# Patient Record
Sex: FEMALE | Race: WHITE | Employment: FULL TIME | URBAN - METROPOLITAN AREA
[De-identification: names, ages, dates, MRNs, and addresses within clinical notes are randomized per-mention and may not be internally consistent; named-entity substitution may affect disease eponyms.]

---

## 2019-01-07 ENCOUNTER — HOSPITAL ENCOUNTER (OUTPATIENT)
Dept: MAMMOGRAPHY | Age: 59
Discharge: HOME | End: 2019-01-09
Payer: COMMERCIAL

## 2019-01-07 DIAGNOSIS — Z12.31 SCREENING MAMMOGRAM, ENCOUNTER FOR: ICD-10-CM

## 2019-01-07 PROCEDURE — 77067 SCR MAMMO BI INCL CAD: CPT

## 2020-09-17 ENCOUNTER — APPOINTMENT (OUTPATIENT)
Dept: GENERAL RADIOLOGY | Age: 60
End: 2020-09-17
Payer: COMMERCIAL

## 2020-09-17 ENCOUNTER — HOSPITAL ENCOUNTER (EMERGENCY)
Age: 60
Discharge: HOME | End: 2020-09-17
Payer: COMMERCIAL

## 2020-09-17 VITALS
HEART RATE: 97 BPM | OXYGEN SATURATION: 99 % | RESPIRATION RATE: 16 BRPM | WEIGHT: 145 LBS | DIASTOLIC BLOOD PRESSURE: 79 MMHG | SYSTOLIC BLOOD PRESSURE: 153 MMHG | TEMPERATURE: 99 F

## 2020-09-17 PROCEDURE — 99212 OFFICE O/P EST SF 10 MIN: CPT

## 2020-09-17 PROCEDURE — 73130 X-RAY EXAM OF HAND: CPT

## 2020-09-17 RX ORDER — FLUTICASONE PROPIONATE 50 MCG
1 SPRAY, SUSPENSION (ML) NASAL DAILY PRN
COMMUNITY

## 2020-09-17 RX ORDER — ESOMEPRAZOLE MAGNESIUM 40 MG/1
40 FOR SUSPENSION ORAL DAILY
COMMUNITY

## 2020-09-17 NOTE — ED PROVIDER NOTES
This is a 27-year-old female that presents to urgent care stating for the past 2 weeks she is been having pain to her left hand. She states she may have injured her hand. She says two weeks ago the box that is on a computer power cord hit her on the left hand as she was pulling the cord out from the wall. Says the hand felt better in a few days but still had pain. About 1 week ago she developed numbness in a part of the hand. Denies wrist or forearm pain or swelling. Has not taken anything for pain or inflammation. Denies any chest pain or shortness of breath. No abdominal pain nausea vomiting diarrhea or urinary symptoms I first contact patient she appears to be in no acute distress. Review of Systems   Constitutional:        Pertinent positives and negatives are stated within HPI, all other systems reviewed and are negative. Physical Exam  Vitals signs and nursing note reviewed. Constitutional:       Appearance: She is well-developed. HENT:      Head: Normocephalic and atraumatic. Right Ear: Hearing and external ear normal.      Left Ear: Hearing and external ear normal.      Nose: Nose normal.      Mouth/Throat:      Pharynx: Uvula midline. Eyes:      General: Lids are normal.      Conjunctiva/sclera: Conjunctivae normal.      Pupils: Pupils are equal, round, and reactive to light. Neck:      Musculoskeletal: Normal range of motion and neck supple. Cardiovascular:      Rate and Rhythm: Normal rate and regular rhythm. Heart sounds: Normal heart sounds. No murmur. Pulmonary:      Effort: Pulmonary effort is normal.      Breath sounds: Normal breath sounds. Abdominal:      General: Bowel sounds are normal.      Palpations: Abdomen is soft. Abdomen is not rigid. Tenderness: There is no abdominal tenderness. There is no guarding or rebound. Musculoskeletal:      Left wrist: Normal.      Comments: Left hand: Tender 2nd metacarpal mostly.  Mild tenderness to 3rd metacarpal. Complains of numbness to the distal 2nd metacarpal area into the webspace between the 2nd and 3rd MCP joints. Denies finger numbness. Pain shoots to the 2nd and 3rd MCP joints when the base of the 2nd metacarpal is palpated. No open area/redness/cyanosis. Cap refill is brisk. Has palpable radial pulse. No weakness. Range of motion is full. Skin:     General: Skin is warm and dry. Findings: No abrasion or rash. Neurological:      Mental Status: She is alert and oriented to person, place, and time. GCS: GCS eye subscore is 4. GCS verbal subscore is 5. GCS motor subscore is 6. Cranial Nerves: No cranial nerve deficit. Sensory: No sensory deficit. Coordination: Coordination normal.      Gait: Gait normal.         Procedures    MDM  Number of Diagnoses or Management Options  Bone lesion:   Hand numbness:   Left hand pain:   Diagnosis management comments: X-ray was reviewed. It does show a possible lesion to that third metacarpal bone. I will give patient referral to Ortho. I will also list hand orthopedics as well. She can also follow-up with her primary care provider I did tell her to make these appointments sooner rather than later. Take some ibuprofen and may use Aspercreme at this point. I told her symptoms worsen go to the emergency department.         --------------------------------------------- PAST HISTORY ---------------------------------------------  Past Medical History:  has no past medical history on file. Past Surgical History:  has a past surgical history that includes  section and Dilation and curettage of uterus. Social History:  reports that she has never smoked. She does not have any smokeless tobacco history on file. She reports that she does not drink alcohol or use drugs. Family History: family history is not on file. The patients home medications have been reviewed.     Allergies: Patient has no known allergies. -------------------------------------------------- RESULTS -------------------------------------------------  No results found for this visit on 09/17/20. XR HAND LEFT (MIN 3 VIEWS)   Final Result   5 mm isoattenuating lesion in shaft of 3rd metacarpal.  Please correlate with   patient history or point tenderness. Further evaluation may be obtained with   MRI of the hand with IV contrast if clinically warranted. ------------------------- NURSING NOTES AND VITALS REVIEWED ---------------------------   The nursing notes within the ED encounter and vital signs as below have been reviewed. BP (!) 153/79   Pulse 97   Temp 99 °F (37.2 °C)   Resp 16   Wt 145 lb (65.8 kg)   SpO2 99%   Oxygen Saturation Interpretation: Normal      ------------------------------------------ PROGRESS NOTES ------------------------------------------   I have spoken with the patient and discussed todays results, in addition to providing specific details for the plan of care and counseling regarding the diagnosis and prognosis. Their questions are answered at this time and they are agreeable with the plan.      --------------------------------- ADDITIONAL PROVIDER NOTES ---------------------------------     This patient is stable for discharge. I have shared the specific conditions for return, as well as the importance of follow-up. * NOTE: This report was transcribed using voice recognition software. Every effort was made to ensure accuracy; however, inadvertent computerized transcription errors may be present.    --------------------------------- IMPRESSION AND DISPOSITION ---------------------------------    IMPRESSION  1. Left hand pain    2. Hand numbness    3.  Bone lesion        DISPOSITION  Disposition: Discharge to home  Patient condition is good            Patti Ferraro PA-C  09/17/20 6023

## 2020-10-23 ENCOUNTER — HOSPITAL ENCOUNTER (EMERGENCY)
Age: 60
Discharge: HOME | End: 2020-10-23
Payer: COMMERCIAL

## 2020-10-23 VITALS
HEIGHT: 60 IN | RESPIRATION RATE: 20 BRPM | BODY MASS INDEX: 29.45 KG/M2 | OXYGEN SATURATION: 95 % | TEMPERATURE: 98.3 F | HEART RATE: 95 BPM | WEIGHT: 150 LBS | SYSTOLIC BLOOD PRESSURE: 152 MMHG | DIASTOLIC BLOOD PRESSURE: 83 MMHG

## 2020-10-23 PROCEDURE — 99212 OFFICE O/P EST SF 10 MIN: CPT

## 2020-10-23 NOTE — ED PROVIDER NOTES
Department of Emergency Medicine   ED  Provider Note  Admit Date/RoomTime: 10/23/2020  1:21 PM  ED Room:   MRN: [id number]  Chief Complaint: Other (  went to    on tuesday  to   bp 154/83    dr  told her to watch    she wants bp checked   thought her machine no t working  well)       History of Present Illness   Source of history provided by:  patient. History/Exam Limitations: none. Mayra Marcos is a 61 y.o. female who presents to the ED with a concern for high blood pressure. Patient states she had an office appointment a couple weeks ago and at that time her blood pressure was 150s over 80s. Advised to just check it at home and follow-up next week. She states today she had elevated readings of 170s over 100s and then just prior to coming in she had 977 systolic reading. Does admit that when she saw the elevated readings and is stressing her out. 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 She states it has just been a very stressful year. She does adamantly deny any headache, lightheadedness, dizziness. Denies feeling like she is going to pass out. Denies chest pain, chest pressure or palpitations. Patient does deny visual loss or blurry vision, but however states that she feels like she needs to blink in order to focus and that is something new. Patient has not been diagnosed with high blood pressure and is not on any daily blood pressure medication. ROS    Pertinent positives and negatives are stated within HPI, all other systems reviewed and are negative. History reviewed. No pertinent past medical history. Past Surgical History:   Procedure Laterality Date     SECTION      DILATION AND CURETTAGE OF UTERUS     Social History:  reports that she has never smoked.  She has never used Consult(s):   None    Procedure(s):   none    MDM:   Current blood pressure is 152/83. She has absolutely no neurological signs. Patient has been advised to check her blood pressure in the morning and then in the evening. NOT multiple times a day. She already has an appointment with her doctor for Monday of next week. Counseling:  I reviewed today's visit with the patient in addition to providing specific details for the plan of care and counseling regarding the diagnosis and prognosis. Questions are answered at this time and are agreeable with the plan. Assessment      1. Elevated blood pressure reading      Plan   Discharge to home  Patient condition is good    New Medications     New Prescriptions    No medications on file     Electronically signed by RITA Jay   DD: 10/23/20  **This report was transcribed using voice recognition software. Every effort was made to ensure accuracy; however, inadvertent computerized transcription errors may be present.   END OF ED PROVIDER NOTE       Daniel Jay  10/23/20 [id number]

## 2021-05-25 ENCOUNTER — OFFICE VISIT (OUTPATIENT)
Dept: CARDIOLOGY CLINIC | Age: 61
End: 2021-05-25
Payer: COMMERCIAL

## 2021-05-25 VITALS
SYSTOLIC BLOOD PRESSURE: 126 MMHG | BODY MASS INDEX: 29.45 KG/M2 | WEIGHT: 150 LBS | DIASTOLIC BLOOD PRESSURE: 72 MMHG | HEART RATE: 71 BPM | HEIGHT: 60 IN

## 2021-05-25 DIAGNOSIS — R07.9 CHEST PAIN, UNSPECIFIED TYPE: Primary | ICD-10-CM

## 2021-05-25 PROCEDURE — G8427 DOCREV CUR MEDS BY ELIG CLIN: HCPCS | Performed by: INTERNAL MEDICINE

## 2021-05-25 PROCEDURE — G8419 CALC BMI OUT NRM PARAM NOF/U: HCPCS | Performed by: INTERNAL MEDICINE

## 2021-05-25 PROCEDURE — 93000 ELECTROCARDIOGRAM COMPLETE: CPT | Performed by: INTERNAL MEDICINE

## 2021-05-25 PROCEDURE — 99243 OFF/OP CNSLTJ NEW/EST LOW 30: CPT | Performed by: INTERNAL MEDICINE

## 2021-05-25 RX ORDER — NAPROXEN 500 MG/1
500 TABLET ORAL PRN
COMMUNITY

## 2021-05-25 RX ORDER — METOPROLOL SUCCINATE 25 MG/1
25 TABLET, EXTENDED RELEASE ORAL DAILY
COMMUNITY
Start: 2021-05-21

## 2021-05-25 RX ORDER — AMLODIPINE BESYLATE 5 MG/1
5 TABLET ORAL DAILY
COMMUNITY
Start: 2021-05-21

## 2021-05-25 NOTE — PROGRESS NOTES
[facility] Cardiology consult  Dr. Ayala Bales      Reason for Consult: Chest Pain  Referring Physician: Maura Werner MD     CHIEF COMPLAINT:   Chief Complaint   Patient presents with    Chest Pain     Consult per Dr Roxane Lin due to CP.  c/o left sided chest pressure with and without activity. c/o anxiety. She went to [facility] ([place], [place]) ER in March with numbness arm and leg. HISTORY OF PRESENT ILLNESS:   61year old female with no significant past medical history is here for evaluation of chest pain,  Chest pain, left-sided, comes and goes, exertional, better with rest, significant enough prompted her to seek medical attention, fatigue, denies any palpitations, no pedal edema, PND, no orthopnea, no syncope, no presyncopal episodes. Past Medical History:   Diagnosis Date    Hypertension          Past Surgical History:   Procedure Laterality Date     SECTION      DILATION AND CURETTAGE OF UTERUS           Current Outpatient Medications   Medication Sig Dispense Refill    amLODIPine (NORVASC) 5 MG tablet Take 5 mg by mouth daily      metoprolol succinate (TOPROL XL) 25 MG extended release tablet Take 25 mg by mouth daily      naproxen (NAPROSYN) 500 MG tablet Take 500 mg by mouth as needed for Pain Not taking      esomeprazole Magnesium (NEXIUM) 40 MG PACK Take 40 mg by mouth daily      fluticasone (FLONASE) 50 MCG/ACT nasal spray 1 spray by Each Nostril route daily as needed        No current facility-administered medications for this visit.          Allergies as of 2021    (No Known Allergies)       Social History     Socioeconomic History    Marital status:      Spouse name: Not on file    Number of children: Not on file    Years of education: Not on file    Highest education level: Not on file   Occupational History    Not on file   Tobacco Use    Smoking status: Never Smoker    Smokeless tobacco: Never Used   Vaping Use    Vaping Use: Never used   Substance and urticaria, hay fever and angioedema  ENDOCRINE:  negative for heat intolerance, cold intolerance, tremor, hair loss and diabetic symptoms including neither polyuria nor polydipsia nor blurred vision  MUSCULOSKELETAL:  negative for  myalgias, arthralgias, joint swelling, stiff joints and decreased range of motion  NEUROLOGICAL:  negative for memory problems, speech problems, visual disturbance, dysphagia, weakness and numbness      PHYSICAL EXAM:   CONSTITUTIONAL:  awake, alert, cooperative, no apparent distress, and appears stated age  EYES:  lids and lashes normal and pupils equal, round and reactive to light, anicteric sclerae  HEAD:  normocepalic, without obvious abnormality, atraumatic, pink, moist mucous membranes. NECK:  Supple, symmetrical, trachea midline, no adenopathy, thyroid symmetric, not enlarged and no tenderness, skin normal  HEMATOLOGIC/LYMPHATICS:  no cervical lymphadenopathy and no supraclavicular lymphadenopathy  LUNGS:  No increased work of breathing, good air exchange, clear to auscultation bilaterally, no crackles or wheezing  CARDIOVASCULAR:  Normal apical impulse, regular rate and rhythm, normal S1 and S2, no S3 or S4, and no murmur noted and no JVD, no carotid bruit, no pedal edema, good carotid upstroke bilaterally. ABDOMEN:  Soft, nontender, no masses, no hepatomegaly or splenomegaly, BS+  CHEST: nontender to palpation, expands symmetrically  MUSCULOSKELETAL:  No clubbing no cyanosis. there is no redness, warmth, or swelling of the joints  full range of motion noted  NEUROLOGIC:  Alert, awake,oriented x3, no focal neurologic deficit was appreciated  SKIN:  no bruising or bleeding, normal skin color, texture, turgor and no redness, warmth, or swelling        /72 (Site: Left Upper Arm, Position: Sitting, Cuff Size: Medium Adult)   Pulse 71   Ht 5' (1.524 m)   Wt 150 lb (68 kg)   BMI 29.29 kg/m²     DATA:   I personally reviewed the visit EKG with the following interpretation: Sinus rhythm    ECHO:   Stress Test:   Angiography:   Cardiology Labs: BMP:  No results found for: NA, K, CL, CO2, BUN, CREATININE  CMP:  No results found for: NA, K, CL, CO2, BUN, CREATININE, PROT, ALB  CBC:  No results found for: WBC, RBC, HGB, HCT, MCV, RDW, PLT  PT/INR:  No results found for: PTINR  PT/INR Warfarin:  No components found for: PTPATWAR, PTINRWAR  PTT:  No results found for: APTT  PTT Heparin:  No components found for: APTTHEP  Magnesium:  No results found for: MG  TSH:  No results found for: TSH  TROPONIN:  No components found for: TROP  BNP:  No results found for: BNP  FASTING LIPID PANEL:  No results found for: CHOL, HDL, TRIG  No orders to display     I have personally reviewed the laboratory, cardiac diagnostic and radiographic testing as outlined above:      IMPRESSION:  1. Chest pain: Atypical, will schedule for regular exercise stress test  2. Hypertension: Controlled    RECOMMENDATIONS:   1. Regular exercise stress test  2. Patient was strongly advised to call 911 if symptoms recur or get worse for any reason  3. Continue current treatment  4. Follow-up with Dr. Ken Ordaz as scheduled  5. Follow-up with Dr. Catarino Bryant after her test    I have reviewed my findings and recommendations with patient    Thank you for the consult  Electronically signed by Amari Abreu MD on 7/31/2021 at 6:59 PM      NOTE: This report was transcribed using voice recognition software.  Every effort was made to ensure accuracy; however, inadvertent computerized transcription errors may be present

## 2021-06-15 ENCOUNTER — TELEPHONE (OUTPATIENT)
Dept: CARDIOLOGY | Age: 61
End: 2021-06-15

## 2021-06-16 ENCOUNTER — HOSPITAL ENCOUNTER (OUTPATIENT)
Dept: CARDIOLOGY | Age: 61
Discharge: HOME | End: 2021-06-16
Payer: COMMERCIAL

## 2021-06-16 VITALS
WEIGHT: 150 LBS | DIASTOLIC BLOOD PRESSURE: 86 MMHG | HEIGHT: 60 IN | BODY MASS INDEX: 29.45 KG/M2 | SYSTOLIC BLOOD PRESSURE: 146 MMHG

## 2021-06-16 DIAGNOSIS — R07.9 CHEST PAIN, UNSPECIFIED TYPE: ICD-10-CM

## 2021-06-16 PROCEDURE — 93017 CV STRESS TEST TRACING ONLY: CPT

## 2021-06-16 NOTE — PROCEDURES
[address] [facility] [address]  [phone]    Exercise Stress Study       Name: [address] [place] [address],[address] Account Number:  [de-identified]      :  1960          Sex: female         Date of Study:  2021    Height: 5' (152.4 cm)          Weight: 150 lb (68 kg)    Ordering Provider: Marbella Martell md          PCP: Nae Morales MD    Cardiologist: Danish Kathleen MD              Interpreting Physician:  Danish Kathleen MD    Indication:   Detecting the presence and location of coronary artery disease    Clinical History:   Patient has no known history of coronary artery disease. Resting ECG:      Sinus rhythm    Exercise: The patient exercised using a Tai protocol, completing 5.00 minutes and reaching an estimated work load of 4.43 metabolic equivalents (METS). Resting HR was 96. Peak exercise heart rate was 159 ( 96% of maximum predicted heart rate for age). Baseline /86. Peak exercise /72. The blood pressure response to exercise was normal      Exercise was terminated due to dyspnea, fatigue and heart rate attained. The patient experienced no chest pain with exercise. Exercise ECG:   The patient demonstrated no arrhythmias during exercise. With exercise, there were no ST segment changes of significance at the heart rate achieved. Impression:    1. Exercise EKG was normal.  2. The patient experienced no chest pain with exercise. 3. Hein treadmill score was 5 implying low risk of acute ischemic events. 4. Exercise capacity was average. Thank you for sending your patient to this [employer].     Electronically signed by Danish Kathleen MD on 21 at 9:00 PM EDT

## 2021-06-18 ENCOUNTER — TELEPHONE (OUTPATIENT)
Dept: CARDIOLOGY CLINIC | Age: 61
End: 2021-06-18

## 2023-04-23 ENCOUNTER — HOSPITAL ENCOUNTER (EMERGENCY)
Age: 63
Discharge: HOME | End: 2023-04-23
Payer: COMMERCIAL

## 2023-04-23 VITALS
HEIGHT: 60 IN | SYSTOLIC BLOOD PRESSURE: 150 MMHG | TEMPERATURE: 98.7 F | BODY MASS INDEX: 27.48 KG/M2 | OXYGEN SATURATION: 98 % | WEIGHT: 140 LBS | DIASTOLIC BLOOD PRESSURE: 82 MMHG | HEART RATE: 83 BPM | RESPIRATION RATE: 18 BRPM

## 2023-04-23 DIAGNOSIS — L30.9 DERMATITIS: Primary | ICD-10-CM

## 2023-04-23 PROCEDURE — 99211 OFF/OP EST MAY X REQ PHY/QHP: CPT

## 2023-04-23 RX ORDER — LORATADINE 10 MG/1
10 TABLET ORAL DAILY
Qty: 30 TABLET | Refills: 0 | Status: SHIPPED | OUTPATIENT
Start: 2023-04-23 | End: 2023-05-23

## 2023-04-23 RX ORDER — PREDNISONE 10 MG/1
TABLET ORAL
Qty: 30 TABLET | Refills: 0 | Status: SHIPPED | OUTPATIENT
Start: 2023-04-23 | End: 2023-05-03

## 2024-05-30 ENCOUNTER — TELEPHONE (OUTPATIENT)
Dept: FAMILY MEDICINE CLINIC | Age: 64
End: 2024-05-30

## 2024-05-30 NOTE — TELEPHONE ENCOUNTER
----- Message from Kaitlin Oscar Alexis sent at 5/30/2024 10:36 AM EDT -----  Regarding: [facility] Appointment Request  [facility] Appointment Request    Patient needs appointment for [facility] Appointment Type: New to Provider.    Reason for Appointment Request: No appointments available during search // Pt preferred Dr. Janiya Johnson  --------------------------------------------------------------------------------------------------------------------------    Relationship to Patient: Spouse/Partner Bill Preston     Call Back Information: OK to leave message on voicemail  Preferred Call Back Number: Phone [phone]

## 2024-07-26 ENCOUNTER — TELEPHONE (OUTPATIENT)
Dept: FAMILY MEDICINE CLINIC | Age: 64
End: 2024-07-26

## 2024-07-26 NOTE — TELEPHONE ENCOUNTER
Called and advised pt Dr Denson is not taking any new patients at this time.  Pt declined to schedule with any of the other providers that are taking new patients.    ----- Message from Freddy Jackson sent at 7/26/2024  9:24 AM EDT -----  Regarding: [facility] Appointment Request  [facility] Appointment Request    Patient needs appointment for [facility] Appointment Type: New to Provider.    Patient Requested Dates(s): Any date available   Patient Requested Time: depends on the available   Provider Name: Dr. Alex Denson    Reason for Appointment Request: Established Patient - Available appointments did not meet patient need  --------------------------------------------------------------------------------------------------------------------------    Relationship to Patient: Self     Call Back Information: OK to leave message on voicemail  Preferred Call Back Number: Phone [phone]    The patient's wants to be establish by Dr. Alex Denson.